# Patient Record
Sex: FEMALE | Race: BLACK OR AFRICAN AMERICAN | Employment: OTHER | URBAN - NONMETROPOLITAN AREA
[De-identification: names, ages, dates, MRNs, and addresses within clinical notes are randomized per-mention and may not be internally consistent; named-entity substitution may affect disease eponyms.]

---

## 2020-03-05 ENCOUNTER — APPOINTMENT (OUTPATIENT)
Dept: CT IMAGING | Age: 85
DRG: 180 | End: 2020-03-05
Payer: MEDICARE

## 2020-03-05 ENCOUNTER — APPOINTMENT (OUTPATIENT)
Dept: GENERAL RADIOLOGY | Age: 85
DRG: 180 | End: 2020-03-05
Payer: MEDICARE

## 2020-03-05 ENCOUNTER — HOSPITAL ENCOUNTER (INPATIENT)
Age: 85
LOS: 3 days | Discharge: HOSPICE | DRG: 180 | End: 2020-03-10
Attending: EMERGENCY MEDICINE | Admitting: HOSPITALIST
Payer: MEDICARE

## 2020-03-05 PROBLEM — R62.51 FAILURE TO THRIVE (0-17): Status: ACTIVE | Noted: 2020-03-05

## 2020-03-05 LAB
ALBUMIN SERPL-MCNC: 3.4 G/DL (ref 3.5–5.2)
ALP BLD-CCNC: 165 U/L (ref 35–104)
ALT SERPL-CCNC: 15 U/L (ref 5–33)
ANION GAP SERPL CALCULATED.3IONS-SCNC: 14 MMOL/L (ref 7–19)
APTT: 25.9 SEC (ref 26–36.2)
AST SERPL-CCNC: 68 U/L (ref 5–32)
BACTERIA: NEGATIVE /HPF
BASOPHILS ABSOLUTE: 0 K/UL (ref 0–0.2)
BASOPHILS RELATIVE PERCENT: 0.4 % (ref 0–1)
BILIRUB SERPL-MCNC: 0.4 MG/DL (ref 0.2–1.2)
BILIRUBIN URINE: ABNORMAL
BLOOD, URINE: NEGATIVE
BUN BLDV-MCNC: 27 MG/DL (ref 8–23)
CALCIUM SERPL-MCNC: 9.2 MG/DL (ref 8.8–10.2)
CHLORIDE BLD-SCNC: 97 MMOL/L (ref 98–111)
CLARITY: ABNORMAL
CO2: 24 MMOL/L (ref 22–29)
COLOR: ABNORMAL
CREAT SERPL-MCNC: 0.9 MG/DL (ref 0.5–0.9)
EOSINOPHILS ABSOLUTE: 0 K/UL (ref 0–0.6)
EOSINOPHILS RELATIVE PERCENT: 0.2 % (ref 0–5)
EPITHELIAL CELLS, UA: 2 /HPF (ref 0–5)
GFR NON-AFRICAN AMERICAN: 59
GLUCOSE BLD-MCNC: 81 MG/DL (ref 74–109)
GLUCOSE URINE: NEGATIVE MG/DL
HCT VFR BLD CALC: 38 % (ref 37–47)
HEMOGLOBIN: 11.5 G/DL (ref 12–16)
HYALINE CASTS: 6 /HPF (ref 0–8)
IMMATURE GRANULOCYTES #: 0 K/UL
INR BLD: 1.03 (ref 0.88–1.18)
KETONES, URINE: NEGATIVE MG/DL
LEUKOCYTE ESTERASE, URINE: NEGATIVE
LYMPHOCYTES ABSOLUTE: 0.9 K/UL (ref 1.1–4.5)
LYMPHOCYTES RELATIVE PERCENT: 10.3 % (ref 20–40)
MCH RBC QN AUTO: 28.9 PG (ref 27–31)
MCHC RBC AUTO-ENTMCNC: 30.3 G/DL (ref 33–37)
MCV RBC AUTO: 95.5 FL (ref 81–99)
MONOCYTES ABSOLUTE: 0.9 K/UL (ref 0–0.9)
MONOCYTES RELATIVE PERCENT: 10.7 % (ref 0–10)
NEUTROPHILS ABSOLUTE: 6.7 K/UL (ref 1.5–7.5)
NEUTROPHILS RELATIVE PERCENT: 77.9 % (ref 50–65)
NITRITE, URINE: NEGATIVE
PDW BLD-RTO: 15 % (ref 11.5–14.5)
PH UA: 5.5 (ref 5–8)
PLATELET # BLD: 242 K/UL (ref 130–400)
PMV BLD AUTO: 8.9 FL (ref 9.4–12.3)
POTASSIUM SERPL-SCNC: 5.3 MMOL/L (ref 3.5–5)
PROTEIN UA: 30 MG/DL
PROTHROMBIN TIME: 13.5 SEC (ref 12–14.6)
RBC # BLD: 3.98 M/UL (ref 4.2–5.4)
RBC UA: 1 /HPF (ref 0–4)
SODIUM BLD-SCNC: 135 MMOL/L (ref 136–145)
SPECIFIC GRAVITY UA: 1.02 (ref 1–1.03)
TOTAL PROTEIN: 6.7 G/DL (ref 6.6–8.7)
TROPONIN: 0.04 NG/ML (ref 0–0.03)
URINE REFLEX TO CULTURE: ABNORMAL
UROBILINOGEN, URINE: 1 E.U./DL
WBC # BLD: 8.6 K/UL (ref 4.8–10.8)
WBC UA: 1 /HPF (ref 0–5)

## 2020-03-05 PROCEDURE — 71250 CT THORAX DX C-: CPT

## 2020-03-05 PROCEDURE — 84484 ASSAY OF TROPONIN QUANT: CPT

## 2020-03-05 PROCEDURE — 81001 URINALYSIS AUTO W/SCOPE: CPT

## 2020-03-05 PROCEDURE — 80053 COMPREHEN METABOLIC PANEL: CPT

## 2020-03-05 PROCEDURE — G0378 HOSPITAL OBSERVATION PER HR: HCPCS

## 2020-03-05 PROCEDURE — 85610 PROTHROMBIN TIME: CPT

## 2020-03-05 PROCEDURE — 85025 COMPLETE CBC W/AUTO DIFF WBC: CPT

## 2020-03-05 PROCEDURE — 85730 THROMBOPLASTIN TIME PARTIAL: CPT

## 2020-03-05 PROCEDURE — 99285 EMERGENCY DEPT VISIT HI MDM: CPT

## 2020-03-05 PROCEDURE — 36415 COLL VENOUS BLD VENIPUNCTURE: CPT

## 2020-03-05 PROCEDURE — 70450 CT HEAD/BRAIN W/O DYE: CPT

## 2020-03-05 PROCEDURE — 93005 ELECTROCARDIOGRAM TRACING: CPT | Performed by: EMERGENCY MEDICINE

## 2020-03-05 PROCEDURE — 71045 X-RAY EXAM CHEST 1 VIEW: CPT

## 2020-03-05 ASSESSMENT — ENCOUNTER SYMPTOMS
CHOKING: 0
STRIDOR: 0
TROUBLE SWALLOWING: 0
VOMITING: 0
COUGH: 0
COLOR CHANGE: 0
ABDOMINAL PAIN: 0
NAUSEA: 0
BACK PAIN: 0
SINUS PAIN: 0
SHORTNESS OF BREATH: 0
DIARRHEA: 0
ABDOMINAL DISTENTION: 0
RHINORRHEA: 0
PHOTOPHOBIA: 0
CHEST TIGHTNESS: 0
EYE PAIN: 0
CONSTIPATION: 0
WHEEZING: 0
SORE THROAT: 0

## 2020-03-06 PROBLEM — Z51.5 TERMINAL CARE: Status: ACTIVE | Noted: 2020-03-06

## 2020-03-06 PROBLEM — E87.5 HYPERKALEMIA: Status: ACTIVE | Noted: 2020-03-06

## 2020-03-06 PROBLEM — E43 SEVERE MALNUTRITION (HCC): Status: ACTIVE | Noted: 2020-03-06

## 2020-03-06 PROBLEM — C34.90 METASTATIC LUNG CANCER (METASTASIS FROM LUNG TO OTHER SITE) (HCC): Status: ACTIVE | Noted: 2020-03-06

## 2020-03-06 PROBLEM — R64 CACHEXIA (HCC): Status: ACTIVE | Noted: 2020-03-06

## 2020-03-06 PROBLEM — G47.00 INSOMNIA: Status: ACTIVE | Noted: 2020-03-06

## 2020-03-06 PROBLEM — R91.8 RIGHT LOWER LOBE LUNG MASS: Status: ACTIVE | Noted: 2020-03-06

## 2020-03-06 PROBLEM — R80.9 PROTEINURIA: Status: ACTIVE | Noted: 2020-03-06

## 2020-03-06 PROBLEM — Z66 DNR (DO NOT RESUSCITATE): Status: ACTIVE | Noted: 2020-03-06

## 2020-03-06 PROBLEM — E86.0 DEHYDRATION: Status: ACTIVE | Noted: 2020-03-06

## 2020-03-06 PROCEDURE — G0378 HOSPITAL OBSERVATION PER HR: HCPCS

## 2020-03-06 PROCEDURE — 99223 1ST HOSP IP/OBS HIGH 75: CPT | Performed by: NURSE PRACTITIONER

## 2020-03-06 PROCEDURE — 99222 1ST HOSP IP/OBS MODERATE 55: CPT | Performed by: INTERNAL MEDICINE

## 2020-03-06 PROCEDURE — 6360000002 HC RX W HCPCS: Performed by: INTERNAL MEDICINE

## 2020-03-06 PROCEDURE — 96374 THER/PROPH/DIAG INJ IV PUSH: CPT

## 2020-03-06 PROCEDURE — 6370000000 HC RX 637 (ALT 250 FOR IP): Performed by: PHYSICIAN ASSISTANT

## 2020-03-06 PROCEDURE — 96372 THER/PROPH/DIAG INJ SC/IM: CPT

## 2020-03-06 PROCEDURE — 6360000002 HC RX W HCPCS: Performed by: PHYSICIAN ASSISTANT

## 2020-03-06 PROCEDURE — 6370000000 HC RX 637 (ALT 250 FOR IP): Performed by: NURSE PRACTITIONER

## 2020-03-06 PROCEDURE — 2580000003 HC RX 258: Performed by: PHYSICIAN ASSISTANT

## 2020-03-06 RX ORDER — ACETAMINOPHEN 650 MG/1
650 SUPPOSITORY RECTAL EVERY 6 HOURS PRN
Status: DISCONTINUED | OUTPATIENT
Start: 2020-03-06 | End: 2020-03-10 | Stop reason: HOSPADM

## 2020-03-06 RX ORDER — IBUPROFEN 400 MG/1
400 TABLET ORAL EVERY 8 HOURS PRN
Qty: 30 TABLET | Refills: 0 | Status: SHIPPED | OUTPATIENT
Start: 2020-03-06 | End: 2020-03-16

## 2020-03-06 RX ORDER — LANOLIN ALCOHOL/MO/W.PET/CERES
3 CREAM (GRAM) TOPICAL NIGHTLY PRN
Qty: 30 TABLET | Refills: 3 | Status: SHIPPED | OUTPATIENT
Start: 2020-03-06 | End: 2020-04-05

## 2020-03-06 RX ORDER — PROMETHAZINE HYDROCHLORIDE 12.5 MG/1
12.5 TABLET ORAL EVERY 6 HOURS PRN
Status: DISCONTINUED | OUTPATIENT
Start: 2020-03-06 | End: 2020-03-10 | Stop reason: HOSPADM

## 2020-03-06 RX ORDER — IBUPROFEN 400 MG/1
400 TABLET ORAL EVERY 6 HOURS PRN
Status: DISCONTINUED | OUTPATIENT
Start: 2020-03-06 | End: 2020-03-10 | Stop reason: HOSPADM

## 2020-03-06 RX ORDER — MORPHINE SULFATE 4 MG/ML
INJECTION, SOLUTION INTRAMUSCULAR; INTRAVENOUS
Status: DISPENSED
Start: 2020-03-06 | End: 2020-03-06

## 2020-03-06 RX ORDER — SODIUM CHLORIDE 0.9 % (FLUSH) 0.9 %
10 SYRINGE (ML) INJECTION EVERY 12 HOURS SCHEDULED
Status: DISCONTINUED | OUTPATIENT
Start: 2020-03-06 | End: 2020-03-10 | Stop reason: HOSPADM

## 2020-03-06 RX ORDER — MORPHINE SULFATE 20 MG/ML
5 SOLUTION ORAL EVERY 4 HOURS PRN
Status: DISCONTINUED | OUTPATIENT
Start: 2020-03-06 | End: 2020-03-10 | Stop reason: HOSPADM

## 2020-03-06 RX ORDER — MORPHINE SULFATE 4 MG/ML
2 INJECTION, SOLUTION INTRAMUSCULAR; INTRAVENOUS
Status: DISCONTINUED | OUTPATIENT
Start: 2020-03-06 | End: 2020-03-10 | Stop reason: HOSPADM

## 2020-03-06 RX ORDER — SODIUM CHLORIDE 0.9 % (FLUSH) 0.9 %
10 SYRINGE (ML) INJECTION PRN
Status: DISCONTINUED | OUTPATIENT
Start: 2020-03-06 | End: 2020-03-10 | Stop reason: HOSPADM

## 2020-03-06 RX ORDER — ONDANSETRON 2 MG/ML
4 INJECTION INTRAMUSCULAR; INTRAVENOUS EVERY 6 HOURS PRN
Status: DISCONTINUED | OUTPATIENT
Start: 2020-03-06 | End: 2020-03-10 | Stop reason: HOSPADM

## 2020-03-06 RX ORDER — LANOLIN ALCOHOL/MO/W.PET/CERES
3 CREAM (GRAM) TOPICAL NIGHTLY PRN
Status: DISCONTINUED | OUTPATIENT
Start: 2020-03-06 | End: 2020-03-10 | Stop reason: HOSPADM

## 2020-03-06 RX ORDER — MORPHINE SULFATE 20 MG/ML
5 SOLUTION ORAL EVERY 4 HOURS PRN
Qty: 4.5 ML | Refills: 0 | Status: SHIPPED | OUTPATIENT
Start: 2020-03-06 | End: 2020-03-09

## 2020-03-06 RX ORDER — POLYETHYLENE GLYCOL 3350 17 G/17G
17 POWDER, FOR SOLUTION ORAL DAILY PRN
Status: DISCONTINUED | OUTPATIENT
Start: 2020-03-06 | End: 2020-03-10 | Stop reason: HOSPADM

## 2020-03-06 RX ORDER — ACETAMINOPHEN 325 MG/1
650 TABLET ORAL EVERY 6 HOURS PRN
Status: DISCONTINUED | OUTPATIENT
Start: 2020-03-06 | End: 2020-03-10 | Stop reason: HOSPADM

## 2020-03-06 RX ORDER — SODIUM CHLORIDE 9 MG/ML
INJECTION, SOLUTION INTRAVENOUS CONTINUOUS
Status: DISCONTINUED | OUTPATIENT
Start: 2020-03-06 | End: 2020-03-10 | Stop reason: HOSPADM

## 2020-03-06 RX ADMIN — ENOXAPARIN SODIUM 40 MG: 40 INJECTION, SOLUTION INTRAVENOUS; SUBCUTANEOUS at 08:56

## 2020-03-06 RX ADMIN — MORPHINE SULFATE 2 MG: 4 INJECTION, SOLUTION INTRAMUSCULAR; INTRAVENOUS at 06:43

## 2020-03-06 RX ADMIN — Medication 5 MG: at 20:13

## 2020-03-06 RX ADMIN — MELATONIN 3 MG: at 20:13

## 2020-03-06 RX ADMIN — MELATONIN 3 MG: at 01:58

## 2020-03-06 RX ADMIN — SODIUM CHLORIDE, PRESERVATIVE FREE 10 ML: 5 INJECTION INTRAVENOUS at 09:00

## 2020-03-06 RX ADMIN — IBUPROFEN 400 MG: 400 TABLET ORAL at 01:58

## 2020-03-06 RX ADMIN — SODIUM CHLORIDE: 9 INJECTION, SOLUTION INTRAVENOUS at 06:17

## 2020-03-06 ASSESSMENT — ENCOUNTER SYMPTOMS
RESPIRATORY NEGATIVE: 1
EYES NEGATIVE: 1
GASTROINTESTINAL NEGATIVE: 1
ALLERGIC/IMMUNOLOGIC NEGATIVE: 1
BACK PAIN: 1

## 2020-03-06 ASSESSMENT — PAIN SCALES - GENERAL
PAINLEVEL_OUTOF10: 4

## 2020-03-06 NOTE — PROGRESS NOTES
Barrera Hines arrived to room # 428. Presented with: Failure to Thrive  Mental Status: Patient is oriented, alert and coherent. Vitals:    03/05/20 2310   BP: 133/77   Pulse: 103   Resp: 18   Temp: 97.3 °F (36.3 °C)   SpO2: 94%     Patient safety contract and falls prevention contract reviewed with patient Yes. Oriented Patient and Family to room. Call light within reach. Yes.   Needs, issues or concerns expressed at this time: no.      Electronically signed by Bret Sandoval RN on 3/5/2020 at 11:21 PM

## 2020-03-06 NOTE — CONSULTS
or organizations: Not on file     Relationship status: Not on file    Intimate partner violence:     Fear of current or ex partner: Not on file     Emotionally abused: Not on file     Physically abused: Not on file     Forced sexual activity: Not on file   Other Topics Concern    Not on file   Social History Narrative    Not on file       Family History:   History reviewed. No pertinent family history. Current Hospital Medications:    Current Facility-Administered Medications: ibuprofen (ADVIL;MOTRIN) tablet 400 mg, 400 mg, Oral, Q6H PRN  melatonin tablet 3 mg, 3 mg, Oral, Nightly PRN  sodium chloride flush 0.9 % injection 10 mL, 10 mL, Intravenous, 2 times per day  sodium chloride flush 0.9 % injection 10 mL, 10 mL, Intravenous, PRN  acetaminophen (TYLENOL) tablet 650 mg, 650 mg, Oral, Q6H PRN **OR** acetaminophen (TYLENOL) suppository 650 mg, 650 mg, Rectal, Q6H PRN  polyethylene glycol (GLYCOLAX) packet 17 g, 17 g, Oral, Daily PRN  promethazine (PHENERGAN) tablet 12.5 mg, 12.5 mg, Oral, Q6H PRN **OR** ondansetron (ZOFRAN) injection 4 mg, 4 mg, Intravenous, Q6H PRN  enoxaparin (LOVENOX) injection 40 mg, 40 mg, Subcutaneous, Daily  0.9 % sodium chloride infusion, , Intravenous, Continuous    Allergies: Allergies   Allergen Reactions    Penicillins Anxiety         Subjective   REVIEW OF SYSTEMS:   Unable to collect. As per records review and family report. Decreased p.o. intake. Weight loss, generalized weakness    Objective   /78   Pulse 93   Temp 97 °F (36.1 °C)   Resp 20   Ht 5' 2\" (1.575 m)   Wt 105 lb (47.6 kg)   SpO2 94%   BMI 19.20 kg/m²     PHYSICAL EXAM:  CONSTITUTIONAL: Alert, appropriate, ill-appearing, cachectic  EYES: Non icteric, EOM intact, pupils equal round   ENT: Mucus membranes moist, no oral pharyngeal lesions, external inspection of ears and nose are normal  NECK: Supple, no masses.   No palpable thyroid mass  CHEST/LUNGS: Decreased breath sounds in the right., normal the questions to the patients satisfaction.     (Please note that portions of this note were completed with a voice recognition program. Efforts were made to edit the dictations but occasionally words are mis-transcribed.)        Mary Velez MD    03/06/20  6:16 AM

## 2020-03-06 NOTE — H&P
[facility] - History & Physical      PCP: No primary care provider on file. Date of Admission: 3/5/2020    Date of Service: 3/5/2020    Chief Complaint:  Failure to thrive    History Of Present Illness: The patient is a very pleasant 80 y.o. female who presented to [facility] ED with family complaining of falls and deterioration of condition. Family at bedside states that she had a fall about 3 months ago and that she has been declining since then. Patient is frail and cachectic. She is alert and oriented though it took her a few minutes to understand my questions when I asked her Broderick Broussardnight are we right now\". She denies chest pain, shortness of breath, abdominal pain, difficulty/burning when urinating. She admitted to some constipation and back pain. She states her back pain is from her mattress at home. She lives alone. Family comes to check on her daily. Family states that pt has not been out of bed much at all since her fall and that they are having increasing difficult time caring for patient at home. CT chest per radiologist shows \"large lesions in the right hemithorax extending from the right hilum into the right lung base, Multiple metastatic lung lesions bilaterally. Mediastinal and right hilar lymphadenopathy, Multiple metastatic liver lesions. \"  Patient and family state they are aware of CT findings by the ED physician. No other documentation can be found in chart for patient's past medical history. Past Medical History:    History reviewed. No pertinent past medical history. Past Surgical History:    History reviewed. No pertinent surgical history. Home Medications:  Prior to Admission medications    Not on File       Allergies:    Patient has no known allergies. Social History:    The patient currently lives alone   Tobacco:   reports that she has been smoking cigarettes. She has never used smokeless tobacco.  Alcohol:   reports previous alcohol use.       Family History:  History reviewed. No pertinent family history. Review of Systems:   Review of Systems   Constitutional: Negative for chills, diaphoresis, fatigue and fever. HENT: Negative for congestion, ear pain, sinus pain, sore throat and trouble swallowing. Eyes: Negative for photophobia and visual disturbance. Respiratory: Negative for cough, choking, chest tightness, shortness of breath, wheezing and stridor. Cardiovascular: Negative for chest pain, palpitations and leg swelling. Gastrointestinal: Negative for abdominal distention, abdominal pain, constipation, diarrhea, nausea and vomiting. Endocrine: Negative for cold intolerance and heat intolerance. Genitourinary: Negative for difficulty urinating, flank pain, frequency and urgency. Musculoskeletal: Negative for arthralgias and myalgias. Skin: Negative for pallor, rash and wound. Neurological: Negative for dizziness, syncope, weakness, light-headedness, numbness and headaches. Hematological: Does not bruise/bleed easily. Psychiatric/Behavioral: Negative for agitation, confusion, dysphoric mood and suicidal ideas. The patient is not nervous/anxious. Physical Examination:  VITALS: BP (!) 155/86   Pulse 102   Temp 98.7 °F (37.1 °C) (Oral)   Resp 20   Ht 5' 2\" (1.575 m)   Wt 105 lb (47.6 kg)   SpO2 92%   BMI 19.20 kg/m²   Physical Exam  Constitutional:       General: She is not in acute distress. Appearance: Normal appearance. She is not ill-appearing, toxic-appearing or diaphoretic. HENT:      Head: Normocephalic and atraumatic. Right Ear: External ear normal.      Left Ear: External ear normal.      Nose: Nose normal. No congestion or rhinorrhea. Mouth/Throat:      Mouth: Mucous membranes are moist.      Pharynx: Oropharynx is clear. Eyes:      General: No scleral icterus. Extraocular Movements: Extraocular movements intact.       Conjunctiva/sclera: Conjunctivae normal.   Neck: Musculoskeletal: Normal range of motion and neck supple. Cardiovascular:      Rate and Rhythm: Normal rate and regular rhythm. Pulses: Normal pulses. Heart sounds: Normal heart sounds. No murmur. No friction rub. No gallop. Pulmonary:      Effort: Pulmonary effort is normal. No respiratory distress. Breath sounds: Normal breath sounds. No stridor. No wheezing, rhonchi or rales. Abdominal:      General: Abdomen is flat. Bowel sounds are normal.      Palpations: Abdomen is soft. Tenderness: There is no abdominal tenderness. Musculoskeletal: Normal range of motion. General: No swelling. Right lower leg: No edema. Left lower leg: No edema. Skin:     General: Skin is warm and dry. Coloration: Skin is not jaundiced. Findings: No erythema, lesion or rash. Comments: She has a knot to the top left chest. No pain or erythema associated    Neurological:      General: No focal deficit present. Mental Status: She is alert. Mental status is at baseline. Cranial Nerves: No cranial nerve deficit. Sensory: No sensory deficit. Motor: No weakness. Comments: pleasantly confused. Took her a while to understand my questions but she is oriented to person place but not the year   Psychiatric:         Mood and Affect: Mood normal.         Behavior: Behavior normal.         Thought Content:  Thought content normal.         Judgment: Judgment normal.          Diagnostic Data:  CBC:  Recent Labs     03/05/20 1951   WBC 8.6   HGB 11.5*   HCT 38.0        BMP:  Recent Labs     03/05/20 1951   *   K 5.3*   CL 97*   CO2 24   BUN 27*   CREATININE 0.9   CALCIUM 9.2     Recent Labs     03/05/20 1951   AST 68*   ALT 15   BILITOT 0.4   ALKPHOS 165*     Coag Panel:   Recent Labs     03/05/20 1951   INR 1.03   PROTIME 13.5   APTT 25.9*     Cardiac Enzymes:   Recent Labs     03/05/20 1951   TROPONINI 0.04*     Urinalysis:  Lab Results   Component Value Date    NITRU Negative 03/05/2020    WBCUA 1 03/05/2020    BACTERIA NEGATIVE 03/05/2020    RBCUA 1 03/05/2020    BLOODU Negative 03/05/2020    SPECGRAV 1.019 03/05/2020    GLUCOSEU Negative 03/05/2020     Ct Head Wo Contrast    Result Date: 3/5/2020  EXAMINATION:  CT HEAD WO CONTRAST  3/5/2020 8:10 PM HISTORY: Altered mental status. TECHNIQUE: Multiple axial images were obtained through the brain without contrast infusion. Multiplanar images were reconstructed. DLP: 597 mGy-cm. Automated exposure control was utilized. COMPARISON: No comparison study. FINDINGS: There are no hemorrhage, edema or mass effect. There is moderate atrophy with associated ventricular prominence. There is low-density in the hemispheric white matter. There is a partially empty appearance of the sella turcica. There is hypertelorism. The visualized paranasal sinuses and mastoid air cells are clear. 1. No hemorrhage, edema or mass effect. No acute findings. 2. Moderate atrophy with associated ventricular prominence. 3. Partially empty appearance of the sella turcica. Hypertelorism. 4. Low-density in the hemispheric white matter is nonspecific and most likely due to chronic small vessel disease. The full report of this exam was immediately signed and available to the emergency room. The patient is currently in the emergency room. Signed by Dr Alix Nava on 3/5/2020 8:13 PM    Ct Chest Wo Contrast    Result Date: 3/5/2020  EXAMINATION:  CT CHEST WO CONTRAST  3/5/2020 8:51 PM HISTORY: Abnormal chest x-ray. COMPARISON: No comparison study. DLP: 425 mGy-cm. Automated exposure control was utilized. TECHNIQUE: Spiral CT was performed of the chest with contrast. Multiplanar images were reconstructed. MEDIASTINUM/VASCULAR: There is atheromatous disease of the thoracic aorta and coronary arteries. There is mild cardiomegaly. The pulmonary arteries are dilated.  There is bulky right paratracheal, pretracheal, right hilar and subcarinal lymphadenopathy. There is a large mass in the right hilum that extends into the right lung base. LUNGS: Large mass in the right hilum extending into the right lung base. There is diffuse pleural involvement throughout the right hemithorax. There is only a small amount of pleural effusion on the right. There are multiple metastatic lung nodules in both lungs. There is centrilobular and paraseptal emphysema. BONES AND SOFT TISSUES: There is scoliosis and degenerative change of the spine. No definite acute bony abnormalities appreciated. UPPER ABDOMEN: There are multiple metastatic liver lesions. The adrenal glands are not well seen. The lack of contrast limits evaluation of the upper abdomen. There are probable cysts in the upper poles of both kidneys. The pancreas is not well evaluated. 1. Large neoplastic lesion in the right hemithorax extending from the right hilum into the right lung base. There is diffuse pleural involvement on the right side. There are multiple metastatic lung lesions bilaterally. There is minimal right pleural effusion. 2. Bulky mediastinal and right hilar lymphadenopathy. 3. Multiple metastatic liver lesions. 4. Emphysema. 5. 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 Mild cardiomegaly. Dilated pulmonary arteries. The full report of this exam was immediately signed and available to the emergency room. The patient is currently in the emergency room. Signed by Dr Bertram Mora on 3/5/2020 8:56 PM        Xr Chest Portable    Result Date: 3/5/2020  EXAMINATION:  XR CHEST PORTABLE  3/5/2020 8:00 PM HISTORY: Altered mental status. COMPARISON: No comparison study. FINDINGS:  There is fairly diffuse opacification of the visualized right lung. There is probable pleural effusion on the right. There is minimal patchy infiltrate at the left lung base. There is cardiomegaly. 1. Diffuse opacity involving the aerated portion of the right lung that may represent pneumonia.

## 2020-03-06 NOTE — PROGRESS NOTES
Consult received. The pt is sleeping . No family present. Hospice will monitor for dc plans and will follow to the SNF for ad to hospice if adm is indicated. pls call hospice at [phone] when family present.

## 2020-03-06 NOTE — CONSULTS
extremities thin  Neurologic: No focal neurologic deficits, normal sensation, alert and oriented  Psychiatric: Alert and oriented, good judgement, mood and affect appropriate  Skin: warm and dry    Assessment and Plan:   Principal Problem:    Right lower lobe lung mass  Active Problems:    Failure to thrive (0-17)    Cachexia (HCC)    Dehydration    Hyperkalemia    Proteinuria  Resolved Problems:    * No resolved hospital problems. *      Visit Summary: I saw the patient at the bedside with her nephew present. Patient is sitting up in bed, pleasant, conversant and was able to answer most orientation questions appropriately. I reviewed palliative medicine services with the patient and her nephew. We discussed information that was presented this morning from oncology and findings from imaging. Patient verbalizes that the \"cancer doctor\" visited this morning and she understands that she \"can't be treated that is all right. \"  Patient appears to be at peace with the information and we discussed hospice services. I reviewed the ability to treat symptoms and keep the patient comfortable as her disease process progresses, patient was agreeable, patient's nephew was in agreement as well. Patient does not have children and her nephew niece help with decision-making. Patient's niece is expected to be here this afternoon after 3 PM and they would like to meet with hospice, I have notified the hospice chaplain so they can assist.  Patient did live alone prior to hospitalization and they would not be able to provide 24-hour care for the patient at home, she will need facility placement and hospice can follow at the facility. Patient does not meet inpatient criteria per Medicare guidelines and she can be evaluated throughout hospitalization in the event that she has changes that may qualify her. At this time, facility placement should be sought.   Patient did eat some breakfast this morning, of regular consistency, and can swallow. I have ordered Roxanol for pain control and this should be utilized first for any pain or SOA. Patient currently denies and does not exhibit discomfort or shortness of air. I also discussed CODE STATUS with the patient and her nephew, reviewed resuscitation measures, and asked the patient what wishes would be if she were to have an event that required resuscitation. Patient responded that if it is her time, \"let the good Lord take me\" and would not want to be brought back. Patient's nephew is in agreement and I will update CODE STATUS to reflect DNR, I notified the patient and the nephew of the change in code status order. All questions were answered, I provided emotional support to the patient nephew. SW and nursing staff notified of the outcome of my. Palliative medicine will continue to follow. Recommendations: Please utilize oral medications for pain/symptom management first. Patient is able to swallow and I have ordered Roxanol to assist with pain management as this can be continued at a facility upon transfer. Patient agreeable to hospice care and has also made herself a DNR, patient's nephew is in agreement. Patient does not meet criteria for inpatient hospice, SW to assist with placement. Thank you for consulting palliative care and allowing us to participate in the care of the patient.       CounselingTopics: Goals of care, Code Status    Time Spent Counselin min                                       Total Face to Face Time: 50 min  Time Spent Reviewing Records/Provider Communication: 15 min  Total Time Spent: 65 min    Electronically signed by JAYJAY Cross on 3/6/2020 at 10:30 AM    (Please note that portions of this note were completed with a voice recognition program.  Efforts were made to edit the dictations but occasionally words are mis-transcribed.)

## 2020-03-06 NOTE — DISCHARGE SUMMARY
nursing staff, who verbalize understanding and are satisfied with the plan. Patient and family have been advised to follow up hospice care physician recommendation regarding further medications and medical recommendations. My prayers go out to the patient and the family at this difficult time.       Signed Electronically:    Sada Paul MD  4:13 PM 3/6/2020

## 2020-03-06 NOTE — CARE COORDINATION
Pt's niece contacted by phone. Would like referral to facility \"[facility]. \" SW faxed referral to [facility] and will follow.     Family currently sorting through pt's documents at her home; [employer], burial decisions, etc.    [id number]: [phone] F: [phone]    Electronically signed by Genesis Medel on 3/6/2020 at 3:34 PM

## 2020-03-06 NOTE — PLAN OF CARE
Nutrition Problem: Severe malnutrition  Intervention: Food and/or Nutrient Delivery: Continue current diet, Start ONS  Nutritional Goals: Pt will consume 50% or more of meals and supplements

## 2020-03-06 NOTE — ED PROVIDER NOTES
American 59 (*)     Alb 3.4 (*)     Alkaline Phosphatase 165 (*)     AST 68 (*)     All other components within normal limits   URINE RT REFLEX TO CULTURE - Abnormal; Notable for the following components:    Clarity, UA CLOUDY (*)     Bilirubin Urine SMALL (*)     Protein, UA 30 (*)     All other components within normal limits   MICROSCOPIC URINALYSIS - Abnormal; Notable for the following components:    Bacteria, UA NEGATIVE (*)     All other components within normal limits   PROTIME-INR       All other labs were within normal range or not returned as of this dictation. EMERGENCY DEPARTMENT COURSE and DIFFERENTIAL DIAGNOSIS/MDM:   Vitals:    Vitals:    03/05/20 2050 03/05/20 2132 03/05/20 2201 03/05/20 2232   BP: (!) 162/87 (!) 162/95 (!) 142/82 (!) 155/86   Pulse: 96 99 99 102   Resp: 12 29 22 20   Temp:    98.7 °F (37.1 °C)   TempSrc:    Oral   SpO2: 93% 92% 92% 92%   Weight:       Height:           MDM  Number of Diagnoses or Management Options  Failure to thrive in adult: new and requires workup  Malignant neoplasm of overlapping sites of right lung [facility]): new and requires workup  Diagnosis management comments: Patient's family brought her around essentially because of the failure to thrive. Work-up this evening showed a large right sided neoplastic lesion in the lung. There are also multiple metastatic lesions noted on the CT. Given the fact the patient is not able to care for herself and her family has done changed and fed her for the past 3 months I feel it best to admit her. I spoke to the hospitalist service and they have agreed to admit her for further evaluation and treatment. Patient Progress  Patient progress: stable      Reassessment      CONSULTS:  IP CONSULT TO HOSPICE  IP CONSULT TO HOSPITALIST    PROCEDURES:  Unless otherwise noted below, none     Procedures    FINAL IMPRESSION      1. Failure to thrive in adult    2.  Malignant neoplasm of overlapping sites of right lung ([address] DISPOSITION/PLAN   DISPOSITION Admitted 03/05/2020 10:43:44 PM      PATIENT REFERRED TO:  No follow-up provider specified.     DISCHARGE MEDICATIONS:  New Prescriptions    No medications on file          (Please note that portions of this note were completed with a voice recognition program.  Efforts were made to edit thedictations but occasionally words are mis-transcribed.)    Haylie Hodge MD (electronically signed)  Attending Emergency Physician          Sumanth Ham MD  03/05/20 5807

## 2020-03-06 NOTE — CARE COORDINATION
SW attempt to see pt/family in regards to dc planning. No family in pt's room at this time.     Per Robbi North note, family to be in around [address] [facility] [address].    Electronically signed by Anthony Gould on 3/6/2020 at 1:39 PM

## 2020-03-07 PROBLEM — C34.91 METASTATIC PRIMARY LUNG CANCER, RIGHT (HCC): Status: ACTIVE | Noted: 2020-03-07

## 2020-03-07 LAB
ANION GAP SERPL CALCULATED.3IONS-SCNC: 16 MMOL/L (ref 7–19)
BUN BLDV-MCNC: 32 MG/DL (ref 8–23)
CALCIUM SERPL-MCNC: 8.6 MG/DL (ref 8.8–10.2)
CHLORIDE BLD-SCNC: 100 MMOL/L (ref 98–111)
CO2: 21 MMOL/L (ref 22–29)
CREAT SERPL-MCNC: 0.9 MG/DL (ref 0.5–0.9)
GFR NON-AFRICAN AMERICAN: 59
GLUCOSE BLD-MCNC: 115 MG/DL (ref 74–109)
HCT VFR BLD CALC: 33.6 % (ref 37–47)
HEMOGLOBIN: 10.2 G/DL (ref 12–16)
MCH RBC QN AUTO: 28.5 PG (ref 27–31)
MCHC RBC AUTO-ENTMCNC: 30.4 G/DL (ref 33–37)
MCV RBC AUTO: 93.9 FL (ref 81–99)
PDW BLD-RTO: 15 % (ref 11.5–14.5)
PLATELET # BLD: 216 K/UL (ref 130–400)
PMV BLD AUTO: 8.6 FL (ref 9.4–12.3)
POTASSIUM SERPL-SCNC: 4.6 MMOL/L (ref 3.5–5)
RBC # BLD: 3.58 M/UL (ref 4.2–5.4)
SODIUM BLD-SCNC: 137 MMOL/L (ref 136–145)
WBC # BLD: 7.3 K/UL (ref 4.8–10.8)

## 2020-03-07 PROCEDURE — 6370000000 HC RX 637 (ALT 250 FOR IP): Performed by: PHYSICIAN ASSISTANT

## 2020-03-07 PROCEDURE — 96372 THER/PROPH/DIAG INJ SC/IM: CPT

## 2020-03-07 PROCEDURE — 6370000000 HC RX 637 (ALT 250 FOR IP): Performed by: NURSE PRACTITIONER

## 2020-03-07 PROCEDURE — 99231 SBSQ HOSP IP/OBS SF/LOW 25: CPT | Performed by: INTERNAL MEDICINE

## 2020-03-07 PROCEDURE — 36415 COLL VENOUS BLD VENIPUNCTURE: CPT

## 2020-03-07 PROCEDURE — 85027 COMPLETE CBC AUTOMATED: CPT

## 2020-03-07 PROCEDURE — 80048 BASIC METABOLIC PNL TOTAL CA: CPT

## 2020-03-07 PROCEDURE — 1210000000 HC MED SURG R&B

## 2020-03-07 PROCEDURE — 6360000002 HC RX W HCPCS: Performed by: PHYSICIAN ASSISTANT

## 2020-03-07 RX ADMIN — ENOXAPARIN SODIUM 40 MG: 40 INJECTION, SOLUTION INTRAVENOUS; SUBCUTANEOUS at 10:31

## 2020-03-07 RX ADMIN — Medication 5 MG: at 13:24

## 2020-03-07 RX ADMIN — Medication 5 MG: at 21:43

## 2020-03-07 ASSESSMENT — ENCOUNTER SYMPTOMS
NAUSEA: 1
SHORTNESS OF BREATH: 1
SORE THROAT: 0
TROUBLE SWALLOWING: 0
EYE DISCHARGE: 0
WHEEZING: 0
DIARRHEA: 0
CONSTIPATION: 0
ABDOMINAL PAIN: 0
VOMITING: 0
EYE ITCHING: 0
COUGH: 1
EYE REDNESS: 0

## 2020-03-07 ASSESSMENT — PAIN SCALES - GENERAL
PAINLEVEL_OUTOF10: 6
PAINLEVEL_OUTOF10: 3
PAINLEVEL_OUTOF10: 6

## 2020-03-07 NOTE — PROGRESS NOTES
[place], [place], [address]      DEPARTMENT OF HOSPITALIST MEDICINE        PROGRESS  NOTE:        S : Patient is still pleasantly confused. She also feels weak and tired. Awaiting placement to skilled nursing facility with hospice. O : HEENT = PERRLA ; CVS = S1 +S2 + 0 ; Respiratory : Bilateral decreased air entry in both lung fields, mild bilateral crackles, fair respiratory effort ; GI : Abdomen soft, nontender, bowel sounds positive, no organomegaly ; Musculoskeletal system : No swelling of extremities, full range of motion ; CNS : Unable to be obtained . .. Patient is pleasantly confused! A :    Principal Problem:    Metastatic lung cancer (metastasis from lung to other site) [facility])  Active Problems:    Right lower lobe lung mass    Malignant neoplasm of overlapping sites of right lung ([address]    Metastatic primary lung cancer, right (HCC)    Failure to thrive (0-17)    Cachexia (HCC)    Dehydration    Hyperkalemia    Proteinuria    Failure to thrive in adult    Severe malnutrition ([address]    Terminal care    Insomnia    DNR (do not resuscitate)      P : Continue with current management. Patient is awaiting DC to skilled nursing facility with hospice. Repeat labs in a.m. Electrolyte replacement as per protocol. Patient will be monitored very closely on the floor. Please see the detailed discharge summary from yesterday for complete details . .. Thank You.         Ralph Valentino MD  3/7/2020, 12:43 PM

## 2020-03-07 NOTE — PROGRESS NOTES
Progress Note    Patient name: Deanna Cadena  Patient : 1934  MR #[id number]  Room: 428    Subjective: mild nausea, decreased appetite though states \"I'm okay\". denies pain at present. HISTORY OF PRESENT ILLNESS:  Ms. Deanna Cadena is a pleasant 80year old female who presented to the ER department 3/5/2020 with complaint of progressive weakness and mental status decline. Apparently this has been going on for about 3 months. The patient has been incapacitated of getting out of bed. They have also noticed a decline in her mental status over the last few days. She has decreased p.o. intake. She has lost weight. Noncontrast CT head 3/5/2020: No acute findings  Noncontrast chest CT 3/5/2020: Documented a large mass in the right hilum extending into the right lung base with diffuse pleural involvement throughout the right hemothorax. bulky right paratracheal, pretracheal, right hilar and subcarinal lymphadenopathy present. Metastatic liver lesions are noted. Ms. Shannon Pittman is debilitated. She has a poor performance status and not a good candidate for anticancer therapy. She and her niece/nephew desire hospice, palliative care consulted. Supportive care with plans are for discharge to [address] [place] [address] with hospice.     Subjective   REVIEW OF SYSTEMS:   Review of Systems   Constitutional: Positive for activity change, appetite change and fatigue. Negative for fever. No night sweats   HENT: Negative for mouth sores, nosebleeds, sore throat and trouble swallowing. Eyes: Negative for discharge, redness and itching. Respiratory: Positive for cough and shortness of breath. Negative for wheezing. No hemoptysis   Cardiovascular: Negative for chest pain, palpitations and leg swelling. Gastrointestinal: Positive for nausea. Negative for abdominal pain, constipation, diarrhea and vomiting. Endocrine: Negative for cold intolerance and heat intolerance.    Genitourinary: Negative for dysuria. Musculoskeletal: Positive for gait problem. Negative for arthralgias. Skin: Negative for pallor and rash. Allergic/Immunologic: Negative for immunocompromised state. Neurological: Negative for seizures and syncope. Hematological: Negative for adenopathy. Does not bruise/bleed easily. Psychiatric/Behavioral: Positive for confusion. Negative for behavioral problems. Objective   PHYSICAL EXAM:  Physical Exam  Vitals signs reviewed. Constitutional:       General: She is not in acute distress. Appearance: She is well-developed. She is not toxic-appearing or diaphoretic. Comments: Appears chronically ill   HENT:      Head: Normocephalic and atraumatic. Right Ear: External ear normal.      Left Ear: External ear normal.      Nose: Nose normal.   Eyes:      General: No scleral icterus. Right eye: No discharge. Left eye: No discharge. Conjunctiva/sclera: Conjunctivae normal.   Neck:      Musculoskeletal: Neck supple. Trachea: No tracheal deviation. Cardiovascular:      Rate and Rhythm: Normal rate and regular rhythm. Heart sounds: No murmur. Pulmonary:      Effort: Pulmonary effort is normal. No respiratory distress. Breath sounds: Examination of the right-middle field reveals decreased breath sounds. Examination of the right-lower field reveals decreased breath sounds. Decreased breath sounds present. No wheezing or rales. Chest:      Chest wall: No tenderness. Abdominal:      General: Bowel sounds are normal. There is no distension. Palpations: Abdomen is soft. There is no mass. Tenderness: There is no abdominal tenderness. There is no guarding. Genitourinary:     Comments: Exam deferred  Musculoskeletal:         General: No tenderness or deformity. Comments: Generalized weakness   Lymphadenopathy:      Cervical: No cervical adenopathy. Right cervical: No superficial cervical adenopathy.      Left cervical: No superficial cervical adenopathy. Upper Body:      Right upper body: No supraclavicular adenopathy. Left upper body: No supraclavicular adenopathy. Comments: No bulky palpable cervical, clavicular, axillary or inguinal adenopathies on the left or right. Skin:     General: Skin is warm and dry. Findings: No rash. Neurological:      Mental Status: She is alert. Comments: follows commands, interactive, appropriate    Psychiatric:         Behavior: Behavior normal. Behavior is cooperative. Thought Content: Thought content normal.         Judgment: Judgment normal.       Vital Signs  BP (!) 158/85   Pulse 93   Temp 97.2 °F (36.2 °C) (Temporal)   Resp 16   Ht 5' 2\" (1.575 m)   Wt 105 lb (47.6 kg)   SpO2 96%   BMI 19.20 kg/m²     Intake/Output Summary (Last 24 hours) at 3/7/2020 0841  Last data filed at 3/7/2020 9446  Gross per 24 hour   Intake 240 ml   Output 300 ml   Net -60 ml     Labs:  CBC:   Recent Labs     03/05/20 1951   WBC 8.6   HGB 11.5*        CMP:   Recent Labs     03/05/20 1951   GLUCOSE 81   BUN 27*   CREATININE 0.9   CO2 24   CALCIUM 9.2   ALKPHOS 165*   AST 68*   ALT 15     Hepatic:   Recent Labs     03/05/20 1951   AST 68*   ALT 15   BILITOT 0.4   ALKPHOS 165*     Troponin:   Recent Labs     03/05/20 1951   TROPONINI 0.04*     BNP: No results for input(s): BNP in the last 72 hours. INR:   Recent Labs     03/05/20 1951   INR 1.03     ABG: No results for input(s): PHART, JDD5ZVK, PO2ART, XXV6NTY, C5QXSEZM, BEART in the last 72 hours. 30 Day lookback of cultures:    Blood Culture Recent: No results for input(s): BC in the last 720 hours. Gram Stain Recent: No results for input(s): LABGRAM in the last 720 hours. Resp Culture Recent: No results for input(s): CULTRESP in the last 720 hours. Body Fluid Recent : No results for input(s): BFCX in the last 720 hours. MRSA Recent : No results for input(s): [facility] in the last 720 hours.    Urine Culture Recent : No results for input(s): LABURIN in the last 720 hours. Organism Recent : No results for input(s): ORG in the last 720 hours. ASSESSMENT:  #Widespread metastatic disease- likely lung cancer or mesothelioma  #Emphysema  #Physical deconditioning  #Poor prognosis-  Very sick patient with very poor performance status. Unfortunately, Ms. Dagoberto George is not a candidate for anticancer treatment. Palliative care/hospice consulted  #Cancer related pain- Roxanol PRN. Appreciate palliative care assist    PLAN:  Comfort measures. DNR. Disposition: Plans for discharge to [facility] with hospice when bed available. I have seen, examined and reviewed patient medication list, appropriate labs and imaging studies. Relevant medical records and other physician notes have been reviewed. I answered all questions to the best of my knowledge and the patient's satisfaction. [facility], APRN  03/07/20  8:41 AM  Physician's attestation/substantial contribution:  I, Dr Tona Quijano, independently performed an evaluation on [facility]. I have reviewed relevant medical information/data to include but not limited to medication list, relevant appropriate labs and imaging when applicable. I reviewed other physician's notes, ancillary services and nurses assessment. I have reviewed the above documentation completed by the Nurse Practitioner or Physician Assistant. Please see my additional contributions to the history of present illness, physical examination, and assessment/medical decision-making that reflect my findings and impressions. I discussed essential elements of the care plan with the NP or PA and the patient as well. I answered all the questions to the patient's satisfaction. I concur with above stated. Og Berger is better. Objective- cachectic. No change  Assessment/plan:  Metastatic malignancy- unfortunately patient is not a candidate for any further work-up.   She has been seen by

## 2020-03-08 LAB
ANION GAP SERPL CALCULATED.3IONS-SCNC: 12 MMOL/L (ref 7–19)
BUN BLDV-MCNC: 31 MG/DL (ref 8–23)
CALCIUM SERPL-MCNC: 9 MG/DL (ref 8.8–10.2)
CHLORIDE BLD-SCNC: 104 MMOL/L (ref 98–111)
CO2: 24 MMOL/L (ref 22–29)
CREAT SERPL-MCNC: 0.9 MG/DL (ref 0.5–0.9)
EKG P AXIS: 52 DEGREES
EKG P-R INTERVAL: 138 MS
EKG Q-T INTERVAL: 338 MS
EKG QRS DURATION: 80 MS
EKG QTC CALCULATION (BAZETT): 413 MS
EKG T AXIS: 75 DEGREES
GFR NON-AFRICAN AMERICAN: 59
GLUCOSE BLD-MCNC: 79 MG/DL (ref 74–109)
HCT VFR BLD CALC: 34 % (ref 37–47)
HEMOGLOBIN: 10.2 G/DL (ref 12–16)
MCH RBC QN AUTO: 28.5 PG (ref 27–31)
MCHC RBC AUTO-ENTMCNC: 30 G/DL (ref 33–37)
MCV RBC AUTO: 95 FL (ref 81–99)
PDW BLD-RTO: 14.8 % (ref 11.5–14.5)
PLATELET # BLD: 208 K/UL (ref 130–400)
PMV BLD AUTO: 8.6 FL (ref 9.4–12.3)
POTASSIUM SERPL-SCNC: 4.6 MMOL/L (ref 3.5–5)
RBC # BLD: 3.58 M/UL (ref 4.2–5.4)
SODIUM BLD-SCNC: 140 MMOL/L (ref 136–145)
WBC # BLD: 6.9 K/UL (ref 4.8–10.8)

## 2020-03-08 PROCEDURE — 6360000002 HC RX W HCPCS: Performed by: PHYSICIAN ASSISTANT

## 2020-03-08 PROCEDURE — 93010 ELECTROCARDIOGRAM REPORT: CPT | Performed by: INTERNAL MEDICINE

## 2020-03-08 PROCEDURE — 85027 COMPLETE CBC AUTOMATED: CPT

## 2020-03-08 PROCEDURE — 2580000003 HC RX 258: Performed by: PHYSICIAN ASSISTANT

## 2020-03-08 PROCEDURE — 1210000000 HC MED SURG R&B

## 2020-03-08 PROCEDURE — 36415 COLL VENOUS BLD VENIPUNCTURE: CPT

## 2020-03-08 PROCEDURE — 80048 BASIC METABOLIC PNL TOTAL CA: CPT

## 2020-03-08 PROCEDURE — 2700000000 HC OXYGEN THERAPY PER DAY

## 2020-03-08 PROCEDURE — 6370000000 HC RX 637 (ALT 250 FOR IP): Performed by: NURSE PRACTITIONER

## 2020-03-08 RX ADMIN — ENOXAPARIN SODIUM 40 MG: 40 INJECTION, SOLUTION INTRAVENOUS; SUBCUTANEOUS at 08:48

## 2020-03-08 RX ADMIN — Medication 5 MG: at 11:44

## 2020-03-08 RX ADMIN — SODIUM CHLORIDE, PRESERVATIVE FREE 10 ML: 5 INJECTION INTRAVENOUS at 08:48

## 2020-03-08 RX ADMIN — Medication 5 MG: at 20:34

## 2020-03-08 ASSESSMENT — PAIN SCALES - GENERAL
PAINLEVEL_OUTOF10: 5
PAINLEVEL_OUTOF10: 2

## 2020-03-08 NOTE — PLAN OF CARE
Problem: Falls - Risk of:  Goal: Will remain free from falls  Description: Will remain free from falls  3/8/2020 0318 by Shelda Sandifer, RN  Outcome: Ongoing  3/7/2020 1637 by Filippo Zimmerman RN  Outcome: Ongoing  Goal: Absence of physical injury  Description: Absence of physical injury  3/8/2020 0318 by Shelda Sandifer, RN  Outcome: Ongoing  3/7/2020 1637 by Filippo Zimmerman RN  Outcome: Ongoing     Problem: Risk for Impaired Skin Integrity  Goal: Tissue integrity - skin and mucous membranes  Description: Structural intactness and normal physiological function of skin and  mucous membranes. 3/8/2020 0318 by Shelda Sandifer, RN  Outcome: Ongoing  3/7/2020 1637 by Filippo Zimmerman RN  Outcome: Ongoing     Problem: Nutrition  Goal: Optimal nutrition therapy  Description: Nutrition Problem: Severe malnutrition  Intervention: Food and/or Nutrient Delivery: Continue current diet, Start ONS  Nutritional Goals: Pt will consume 50% or more of meals and supplements     3/8/2020 0318 by Shelda Sandifer, RN  Outcome: Ongoing  3/7/2020 1637 by Filippo Zimmerman RN  Outcome: Ongoing     Problem:  Activity:  Goal: Ability to tolerate increased activity will improve  Description: Ability to tolerate increased activity will improve  3/8/2020 0318 by Shelda Sandifer, RN  Outcome: Ongoing  3/7/2020 1637 by Filippo Zimmerman RN  Outcome: Ongoing  Goal: Range of joint motion will improve  Description: Range of joint motion will improve  3/8/2020 0318 by Shelda Sandifer, RN  Outcome: Ongoing  3/7/2020 1637 by Filippo Zimmerman RN  Outcome: Ongoing  Goal: Ability to safely and independently change position in bed will improve  Description: Ability to safely and independently change position in bed will improve  3/8/2020 0318 by Shelda Sandifer, RN  Outcome: Ongoing  3/7/2020 1637 by Filippo Zimmerman RN  Outcome: Ongoing  Goal: Muscle strength will improve  Description: Muscle strength will improve  3/8/2020 0318 by Kathrin Ignacio RN  Outcome: Ongoing  3/7/2020 1637 by Stepan Byrne RN  Outcome: Ongoing     Problem: Physical Regulation:  Goal: Ability to avoid complications of mobility impairment will improve  Description: Ability to avoid complications of mobility impairment will improve  3/8/2020 0318 by Kathrin Ignacio RN  Outcome: Ongoing  3/7/2020 1637 by Stepan Byrne RN  Outcome: Ongoing

## 2020-03-08 NOTE — PROGRESS NOTES
[place], [place], [address]      DEPARTMENT OF HOSPITALIST MEDICINE        PROGRESS  NOTE:        S : Patient is still pleasantly confused. She feels weak and tired. Awaiting placement to skilled nursing facility with hospice. O : HEENT = PERRLA ; CVS = S1 +S2 + 0 ; Respiratory : Bilateral decreased air entry in both lung fields, mild bilateral crackles, fair respiratory effort ; GI : Abdomen soft, nontender, bowel sounds positive, no organomegaly ; Musculoskeletal system : No swelling of extremities, full range of motion ; CNS : Unable to be obtained . .. Patient is pleasantly confused! A :    Principal Problem:    Metastatic lung cancer (metastasis from lung to other site) [facility])  Active Problems:    Right lower lobe lung mass    Malignant neoplasm of overlapping sites of right lung ([facility]    Metastatic primary lung cancer, right (HCC)    Failure to thrive (0-17)    Cachexia (HCC)    Dehydration    Hyperkalemia    Proteinuria    Failure to thrive in adult    Severe malnutrition ([facility] [address]    Terminal care    Insomnia    DNR (do not resuscitate)      P : Continue with current management. I spoke with the family at bedside. They find themselves unable to take care of her at home even with hospice. Patient is awaiting DC to skilled nursing facility with hospice. Possible DC planning in a.m. The patient's management will be taken over by my covering Hospitalist, Dr. Jose Lima, in a.m. [facility] I am signing off! Please see the detailed discharge summary from Thursday, 3/6/2020 for complete details . .. Thank You.         Anna Warner MD  3/8/2020, 6:08 PM

## 2020-03-09 PROCEDURE — 2700000000 HC OXYGEN THERAPY PER DAY

## 2020-03-09 PROCEDURE — 6370000000 HC RX 637 (ALT 250 FOR IP): Performed by: NURSE PRACTITIONER

## 2020-03-09 PROCEDURE — 1210000000 HC MED SURG R&B

## 2020-03-09 PROCEDURE — 2580000003 HC RX 258: Performed by: PHYSICIAN ASSISTANT

## 2020-03-09 PROCEDURE — 6360000002 HC RX W HCPCS: Performed by: PHYSICIAN ASSISTANT

## 2020-03-09 RX ADMIN — ENOXAPARIN SODIUM 40 MG: 40 INJECTION, SOLUTION INTRAVENOUS; SUBCUTANEOUS at 09:55

## 2020-03-09 RX ADMIN — SODIUM CHLORIDE, PRESERVATIVE FREE 10 ML: 5 INJECTION INTRAVENOUS at 22:26

## 2020-03-09 RX ADMIN — Medication 5 MG: at 16:38

## 2020-03-09 ASSESSMENT — PAIN SCALES - GENERAL
PAINLEVEL_OUTOF10: 0
PAINLEVEL_OUTOF10: 1
PAINLEVEL_OUTOF10: 5

## 2020-03-09 ASSESSMENT — PAIN DESCRIPTION - ONSET: ONSET: ON-GOING

## 2020-03-09 ASSESSMENT — PAIN DESCRIPTION - LOCATION: LOCATION: GENERALIZED

## 2020-03-09 ASSESSMENT — PAIN DESCRIPTION - PAIN TYPE: TYPE: CHRONIC PAIN

## 2020-03-09 ASSESSMENT — PAIN DESCRIPTION - FREQUENCY: FREQUENCY: INTERMITTENT

## 2020-03-09 NOTE — CARE COORDINATION
SW spoke w/ Millie Settler at nurses station. Confirmed that equipment will be s/u at pt's house tomorrow morning and pt can dc home w/ family and hospice.     Electronically signed by Hermila Johnson on 3/9/2020 at 4:20 PM

## 2020-03-09 NOTE — CARE COORDINATION
SW placed a call to Iza w/ hospice in regards to family notify nursing they would like to discuss arrangement of DME at home w/ hospice and family caregivers.     Electronically signed by Dao Lou on 3/9/2020 at 2:58 PM

## 2020-03-09 NOTE — PLAN OF CARE
Problem: Falls - Risk of:  Goal: Will remain free from falls  Description: Will remain free from falls  Outcome: Ongoing  Goal: Absence of physical injury  Description: Absence of physical injury  Outcome: Ongoing     Problem: Risk for Impaired Skin Integrity  Goal: Tissue integrity - skin and mucous membranes  Description: Structural intactness and normal physiological function of skin and  mucous membranes. Outcome: Ongoing     Problem: Nutrition  Goal: Optimal nutrition therapy  Description: Nutrition Problem: Severe malnutrition  Intervention: Food and/or Nutrient Delivery: Continue current diet, Start ONS  Nutritional Goals: Pt will consume 50% or more of meals and supplements     Outcome: Ongoing     Problem:  Activity:  Goal: Ability to tolerate increased activity will improve  Description: Ability to tolerate increased activity will improve  Outcome: Ongoing  Goal: Range of joint motion will improve  Description: Range of joint motion will improve  Outcome: Ongoing  Goal: Ability to safely and independently change position in bed will improve  Description: Ability to safely and independently change position in bed will improve  Outcome: Ongoing  Goal: Muscle strength will improve  Description: Muscle strength will improve  Outcome: Ongoing     Problem: Physical Regulation:  Goal: Ability to avoid complications of mobility impairment will improve  Description: Ability to avoid complications of mobility impairment will improve  Outcome: Ongoing

## 2020-03-09 NOTE — PROGRESS NOTES
Consult received. Met with the pt her nephew and niece to discuss home hospice services. The pt does want to receive home hospice services when she is dc'd. She will be going to her nieces home [address] [facility] [place]  [address] The needed equipment will be set up tomorrow. It will then be ok to dc the pt when medically ready. Hospice will meet the pt at her nieces home for adm to hospice.

## 2020-03-09 NOTE — PLAN OF CARE
Problem: Falls - Risk of:  Goal: Will remain free from falls  Description: Will remain free from falls  3/9/2020 1048 by Martine Rehman RN  Outcome: Ongoing  3/9/2020 0305 by Magdalene Coats RN  Outcome: Ongoing  Goal: Absence of physical injury  Description: Absence of physical injury  3/9/2020 1048 by Martine Rehman RN  Outcome: Ongoing  3/9/2020 0305 by Magdalene Coats RN  Outcome: Ongoing     Problem: Risk for Impaired Skin Integrity  Goal: Tissue integrity - skin and mucous membranes  Description: Structural intactness and normal physiological function of skin and  mucous membranes. 3/9/2020 1048 by Martine Rehman RN  Outcome: Ongoing  3/9/2020 0305 by Magdalene Coats RN  Outcome: Ongoing     Problem: Nutrition  Goal: Optimal nutrition therapy  Description: Nutrition Problem: Severe malnutrition  Intervention: Food and/or Nutrient Delivery: Continue current diet, Start ONS  Nutritional Goals: Pt will consume 50% or more of meals and supplements     3/9/2020 1048 by Martine Rehman RN  Outcome: Ongoing  3/9/2020 0305 by Magdalene Coats RN  Outcome: Ongoing     Problem:  Activity:  Goal: Ability to tolerate increased activity will improve  Description: Ability to tolerate increased activity will improve  3/9/2020 1048 by Martine Rehman RN  Outcome: Ongoing  3/9/2020 0305 by Magdalene Coats RN  Outcome: Ongoing  Goal: Range of joint motion will improve  Description: Range of joint motion will improve  3/9/2020 1048 by Martine Rehman RN  Outcome: Ongoing  3/9/2020 0305 by Magdalene Coats RN  Outcome: Ongoing  Goal: Ability to safely and independently change position in bed will improve  Description: Ability to safely and independently change position in bed will improve  3/9/2020 1048 by Martine Rehman RN  Outcome: Ongoing  3/9/2020 0305 by Magdalene Cotas RN  Outcome: Ongoing  Goal: Muscle strength will improve  Description: Muscle strength will improve  3/9/2020 1048 by Martine Remhan RN  Outcome: Ongoing  3/9/2020 0305 by Fermin Rodriguez RN  Outcome: Ongoing     Problem: Physical Regulation:  Goal: Ability to avoid complications of mobility impairment will improve  Description: Ability to avoid complications of mobility impairment will improve  3/9/2020 1048 by Dimitrios Diallo RN  Outcome: Ongoing  3/9/2020 0305 by Fermin Rodriguez RN  Outcome: Ongoing

## 2020-03-09 NOTE — PROGRESS NOTES
Visited with pt and family, two visits. Pt had a POA prepared to sign, be witnessed and notarized. Sanderary arrived and witnesses provided by family, not related to pt. [place] spoke with pt to ensure she understood what she was signing. Pt signed as best she could, the two witnesses signed and the notary notarized the document. This  provided spiritual care with assistance with document, sustaining presence, support, and prayer. Pt and family expressed gratitude for spiritual care.     Electronically signed by Vj Betts on 3/9/2020 at 4:27 PM

## 2020-03-10 VITALS
TEMPERATURE: 97.5 F | HEIGHT: 62 IN | SYSTOLIC BLOOD PRESSURE: 131 MMHG | OXYGEN SATURATION: 93 % | HEART RATE: 102 BPM | DIASTOLIC BLOOD PRESSURE: 70 MMHG | RESPIRATION RATE: 18 BRPM | BODY MASS INDEX: 19.32 KG/M2 | WEIGHT: 105 LBS

## 2020-03-10 PROCEDURE — 2700000000 HC OXYGEN THERAPY PER DAY

## 2020-03-10 NOTE — DISCHARGE SUMMARY
[facility] [address] HOSPITALIST MEDICINE    Patient could not be discharged over last few days. Please see the progress note from today as the addendum to the DC summary from 3/6/2020. Patient is being discharged today. DISCHARGE SUMMARY:      PATIENT NAME:  Gavin Gardner  :  1934  MRN:  [id number]    Admission Date:   3/5/2020  7:13 PM Attending: Wilder Glasgow MD   Discharge Date:   3/10/2020              PCP: JAYJAY Dillard  Length of Stay: 3 days     Chief Complaint on Admission:   Chief Complaint   Patient presents with    Altered Mental Status     X3 Months    Fall     3 months ago; with decline in mental status and increased weakness since that time       Consultants:     IP CONSULT TO HOSPITALIST  IP CONSULT TO ONCOLOGY  IP CONSULT TO Dignamodimitrios TO [address] [place] [address]       Discharge Problem List:   Principal Problem:    Metastatic lung cancer (metastasis from lung to other site) [facility])  Active Problems:    Right lower lobe lung mass    Malignant neoplasm of overlapping sites of right lung ([address]    Metastatic primary lung cancer, right ([address]    Failure to thrive (0-17)    Cachexia ([address]    Dehydration    Hyperkalemia    Proteinuria    Failure to thrive in adult    Severe malnutrition ([address]    Terminal care    Insomnia    DNR (do not resuscitate)  Resolved Problems:    * No resolved hospital problems. [facility] Course and Treatment:  Gavin Gardner is an 80 y.o. female who was admitted to [address] [place] [address] on 3/5/2020 with Metastatic lung cancer (metastasis from lung to other site) [facility])     3/6/2020: I saw the patient at the bedside and spoke with the family in detail. Patient likely has metastatic lung cancer with accelerated functional decline and terminal prognosis, which has been confirmed by hematology oncology as well who do not want to do any aggressive management or work-up on this patient.   She is not a candidate for cancer bilaterally. There is minimal right pleural effusion. 2. Bulky mediastinal and right hilar lymphadenopathy. 3. Multiple metastatic liver lesions. 4. Emphysema. 5. 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 Mild cardiomegaly. Dilated pulmonary arteries. The full report of this exam was immediately signed and available to the emergency room. The patient is currently in the emergency room. Signed by Dr Estiven Presley on 3/5/2020 8:56 PM    Xr Chest Portable    Result Date: 3/5/2020  1. Diffuse opacity involving the aerated portion of the right lung that may represent pneumonia. There is probable pleural effusion on the right side as her peers to be some volume loss of the right lung. The differential includes infection as well as neoplasm on the right side. 2. Minimal patchy infiltrate left lung base. 3. Cardiomegaly. Signed by Dr Estiven Presley on 3/5/2020 8:01 PM        Yesica iRos   [facility] Medication Instructions [id number]:[id number]    Printed on:03/10/20 0821   Medication Information                      ibuprofen (ADVIL;MOTRIN) 400 MG tablet  Take 1 tablet by mouth every 8 hours as needed for Pain (Please take with food)             melatonin 3 MG TABS tablet  Take 1 tablet by mouth nightly as needed (Insomnia)             Morphine Sulfate (MORPHINE 20MG/ML) SOLN concentrated solution  Take 0.25 mLs by mouth every 4 hours as needed for Pain for up to 3 days.                    ISSUES TO BE ADDRESSED AT HOSPITAL FOLLOW-UP VISIT:  Palliative/comfort care at skilled nursing facility    Condition on Discharge: rapidly worsening  Discharge Disposition: Hospice Facility    Recommended Follow Up:  JAYJAY Montelongo  [address] [facility] [address] on 3/16/2020  AT 2:00 PM    [place] [address]  [phone]        Followup Appointments Scheduled at Time of Discharge:  Future Appointments   Date Time Provider

## 2020-03-10 NOTE — PROGRESS NOTES
The needed equipment is set up in the home. It is ok to dc the pt when medically ready. Hospice will meet the pt at her home for adm to hospice.

## 2020-03-10 NOTE — PROGRESS NOTES
The equipment is scheduled to be set up today between 130p and 3p. This ch will notify the pts nurse when the equipment is in place.

## 2020-03-10 NOTE — PROGRESS NOTES
[place], [place], [address]      DEPARTMENT OF HOSPITALIST MEDICINE        PROGRESS  NOTE:        S : Patient is still pleasantly confused. She is finally leaving to go to her niece's house with hospice care will take over. O : HEENT = PERRLA ; CVS = S1 +S2 + 0 ; Respiratory : Bilateral decreased air entry in both lung fields, mild bilateral crackles, fair respiratory effort ; GI : Abdomen soft, nontender, bowel sounds positive, no organomegaly ; Musculoskeletal system : No swelling of extremities, full range of motion ; CNS : Unable to be obtained . .. Patient is pleasantly confused! A :    Principal Problem:    Metastatic lung cancer (metastasis from lung to other site) [facility])  Active Problems:    Right lower lobe lung mass    Malignant neoplasm of overlapping sites of right lung ([address]    Metastatic primary lung cancer, right (HCC)    Failure to thrive (0-17)    Cachexia (HCC)    Dehydration    Hyperkalemia    Proteinuria    Failure to thrive in adult    Severe malnutrition ([address]    Terminal care    Insomnia    DNR (do not resuscitate)      P : Patient is going to her niece's home tomorrow under hospice care. Hospice is delivering DME today to the house and patient. Further management as per his hospice care physician.      Abdulkadir Singh MD  3/10/2020, 2:59 PM

## 2020-03-10 NOTE — PROGRESS NOTES
Attempted to call family and was given number of Yenny Ford who would be picking her up this morning sometime but voicemail is full and I was unable to leave message.

## 2020-03-20 PROBLEM — Z51.5 HOSPICE CARE PATIENT: Status: ACTIVE | Noted: 2020-03-11

## 2020-04-05 PROBLEM — E86.0 DEHYDRATION: Status: RESOLVED | Noted: 2020-03-06 | Resolved: 2020-04-05
